# Patient Record
Sex: FEMALE | Race: WHITE | Employment: OTHER | URBAN - METROPOLITAN AREA
[De-identification: names, ages, dates, MRNs, and addresses within clinical notes are randomized per-mention and may not be internally consistent; named-entity substitution may affect disease eponyms.]

---

## 2018-07-02 ENCOUNTER — HOSPITAL ENCOUNTER (OUTPATIENT)
Dept: VASCULAR SURGERY | Age: 63
Discharge: HOME | End: 2018-07-02
Attending: FAMILY MEDICINE
Payer: MEDICARE

## 2018-07-02 DIAGNOSIS — R09.89 PULSE PRESSURE DECREASE: ICD-10-CM

## 2018-07-02 DIAGNOSIS — M79.605 LEFT LEG PAIN: ICD-10-CM

## 2018-07-02 PROCEDURE — 93922 UPR/L XTREMITY ART 2 LEVELS: CPT

## 2018-07-02 NOTE — PROCEDURES
[address]  *** FINAL REPORT ***    Name: Juan Zavala  MRN: [id number]    Outpatient  : 10 Mar 1955  [facility] Order #: [id number]  [id number] [facility] Visit #: [id number]  Date: 2018    TYPE OF TEST: Peripheral Arterial Testing    REASON FOR TEST  Left leg pain, decreased pulses in dorsalis pedis    Right Leg  Segmentals: Normal                     mmHg  Brachial         125  High thigh  Low thigh  Calf  Posterior tibial 153  Dorsalis pedis   147  Peroneal  Metatarsal  Toe pressure     103  Doppler:    Normal  PVR:        Normal  Ankle/Brachial: 1.22    Left Leg  Segmentals: Normal                     mmHg  Brachial         124  High thigh  Low thigh  Calf  Posterior tibial 156  Dorsalis pedis   151  Peroneal  Metatarsal  Toe pressure     121  Doppler:    Normal  PVR:        Normal  Ankle/Brachial: 1.25    INTERPRETATION/FINDINGS  PROCEDURE:  Evaluation of lower extremity arteries with systolic blood   pressure measurement at the ankle and brachial level and calculation  of the ankle/brachial pressure index (RICO). The exam may also include   pulse volume recording (PVR) plethysmography at the ankle level. CONCLUSION:  1. No evidence of significant peripheral arterial disease at rest in  the right leg. 2. No evidence of significant peripheral arterial disease at rest in  the left leg. 3. The right ankle/brachial index is 1.22 and the left ankle/brachial  index is 1.25.  4. The right toe/brachial index is 0.82 and the left toe/brachila  index is 0.97. ADDITIONAL COMMENTS    I have personally reviewed the data relevant to the interpretation of  this  study. TECHNOLOGIST: BRODY Dietz  Signed: 2018 10:00 AM    PHYSICIAN: Matthew Núñez.  Rebekah Marquez MD  Signed: 2018 07:16 PM

## 2020-01-30 ENCOUNTER — HOSPITAL ENCOUNTER (OUTPATIENT)
Dept: MRI IMAGING | Age: 65
Discharge: HOME | End: 2020-01-30
Attending: ORTHOPAEDIC SURGERY
Payer: MEDICARE

## 2020-01-30 DIAGNOSIS — M25.512 LEFT SHOULDER PAIN: ICD-10-CM

## 2020-01-30 PROCEDURE — 73221 MRI JOINT UPR EXTREM W/O DYE: CPT

## 2020-06-11 ENCOUNTER — HOSPITAL ENCOUNTER (OUTPATIENT)
Dept: MRI IMAGING | Age: 65
Discharge: HOME | End: 2020-06-11
Attending: ORTHOPAEDIC SURGERY
Payer: MEDICARE

## 2020-06-11 DIAGNOSIS — S83.241A ACUTE MEDIAL MENISCUS TEAR OF RIGHT KNEE: ICD-10-CM

## 2020-06-11 PROCEDURE — 73721 MRI JNT OF LWR EXTRE W/O DYE: CPT
